# Patient Record
Sex: FEMALE | Race: OTHER | Employment: UNEMPLOYED | URBAN - METROPOLITAN AREA
[De-identification: names, ages, dates, MRNs, and addresses within clinical notes are randomized per-mention and may not be internally consistent; named-entity substitution may affect disease eponyms.]

---

## 2019-07-15 ENCOUNTER — OFFICE VISIT (OUTPATIENT)
Dept: FAMILY MEDICINE CLINIC | Age: 39
End: 2019-07-15

## 2019-07-15 VITALS
BODY MASS INDEX: 22.86 KG/M2 | DIASTOLIC BLOOD PRESSURE: 80 MMHG | RESPIRATION RATE: 16 BRPM | TEMPERATURE: 98.2 F | OXYGEN SATURATION: 99 % | HEIGHT: 63 IN | SYSTOLIC BLOOD PRESSURE: 100 MMHG | WEIGHT: 129 LBS | HEART RATE: 76 BPM

## 2019-07-15 DIAGNOSIS — R19.4 CHANGE IN BOWEL HABITS: Primary | ICD-10-CM

## 2019-07-15 DIAGNOSIS — R10.11 RUQ ABDOMINAL PAIN: ICD-10-CM

## 2019-07-15 RX ORDER — CHOLECALCIFEROL (VITAMIN D3) 125 MCG
CAPSULE ORAL
COMMUNITY

## 2019-07-15 NOTE — PROGRESS NOTES
Patient: Jing Gee MRN: [id number]  SSN: [id number]    YOB: 1980  Age: 42 y.o. Sex: female      Date of Service: 7/15/2019   Provider: NICOLLE Siegel   Office Location:   [address] [place], [address]  Office Phone: [phone]  Office Fax: [phone]        REASON FOR VISIT:   Chief Complaint   Patient presents with    New Patient    Abdominal Pain     changes in bowel movement - sometimes loose stool or constipation - wonders if ulcer and no pain today        VITALS:   Visit Vitals  /80 (BP 1 Location: Left arm, BP Patient Position: Sitting)   Pulse 76   Temp 98.2 °F (36.8 °C) (Oral)   Resp 16   Ht 5' 2.75\" (1.594 m)   Wt 129 lb (58.5 kg)   LMP 06/15/2019   SpO2 99%   BMI 23.03 kg/m²       MEDICATIONS:   Current Outpatient Medications   Medication Sig Dispense Refill    acetaminophen/pamabrom (MIDOL PO) Take  by mouth.  naproxen sodium (ALEVE) 220 mg cap Take  by mouth. ALLERGIES:   No Known Allergies     MEDICAL/SURGICAL HISTORY:  Past Medical History:   Diagnosis Date    Migraine       History reviewed. No pertinent surgical history. FAMILY HISTORY:  Family History   Problem Relation Age of Onset    Other Mother         stomach ulcer        SOCIAL HISTORY:  Social History     Tobacco Use    Smoking status: Former Smoker     Years: 15.00     Types: Cigarettes    Smokeless tobacco: Never Used    Tobacco comment: 2-3 cigs per day   Substance Use Topics    Alcohol use: Yes     Frequency: Monthly or less     Drinks per session: 3 or 4     Binge frequency: Less than monthly            HISTORY OF PRESENT ILLNESS:   Jing Gee is a 42 y.o. female who presents to the office to establish care as a new patient. She is transferring her care from [address] [place] [address] Here today with complaints of longstanding gastrointestinal issues.  Patient states symptoms began 5-6 years ago with constipation and blood in her stool. She experimented with her diet and eventually was able to get symptoms under control. She also uses witch hazel hemorrhoid wipes intermittently. Has not seen any blood in her stool for over a year. Lately, however, she has noticed some different gastrointestinal problems. States \"I feel like my food isn't being digested properly. \" Sometimes after eating she will develop pain in her mid-upper right abdomen. She wonders if she might have gallstones. She also notes that while her stools are solid and formed for the most part, she will occasionally have loose and watery stool for no apparent reason. Again, denies blood in her stool recently. She admits she has not paid much attention to see if certain foods are triggering her symptoms. She denies associated fevers, chills, nausea or vomiting  Reports that her mother has a history of peptic ulcers, but denies any FH of IBD or gastrointestinal cancers. Health Maintenance -   Previous PCP - [address] [place] [address] 64 PE with routine labs - UTD per patient   Last pap smear - 12/2018 through previous PCP, normal per patient   Immunization status -     REVIEW OF SYSTEMS:  ROS (see scanned H&P form for complete 12 point ROS)     PHYSICAL EXAMINATION:  Physical Exam   Constitutional: She is oriented to person, place, and time and well-developed, well-nourished, and in no distress. HENT:   Head: Normocephalic and atraumatic. Right Ear: External ear normal.   Left Ear: External ear normal.   Nose: Nose normal.   Eyes: Pupils are equal, round, and reactive to light. Conjunctivae are normal. Right eye exhibits no discharge. Left eye exhibits no discharge. Neck: Neck supple. Cardiovascular: Normal rate, regular rhythm and normal heart sounds. Exam reveals no gallop and no friction rub. No murmur heard. Pulmonary/Chest: Effort normal and breath sounds normal. No stridor. She has no wheezes. She has no rales.    Abdominal: Soft. Bowel sounds are normal. She exhibits no distension and no mass. There is no tenderness. There is no rebound and no guarding. Lymphadenopathy:     She has no cervical adenopathy. Neurological: She is alert and oriented to person, place, and time. Gait normal.   Skin: Skin is warm and dry. No rash noted. Psychiatric: Mood, memory and affect normal.        RESULTS:  No results found for this visit on 07/15/19. ASSESSMENT/PLAN:  Diagnoses and all orders for this visit:    1. Change in bowel habits  2. RUQ abdominal pain  - Patient states she had routine labs drawn recently through her old PCP. Unsure if they checked a TSH. Will request those records  - For starters, I've asked her to keep a diary of her symptoms/diet to try to identify potential trigger foods (may be a lactose or gluten intolerance)  - Given post prandial RUQ pain will get ultrasound to r/o gallbladder ds  Orders:    -      ABD LTD; Future      Follow-up and Dispositions    · Return for follow-up in early September for routine care.             PATIENT CARE TEAM:   Patient Care Team:  NICOLLE Natarajan as PCP - General (Physician Assistant)       NICOLLE Fam   July 15, 2019   12:45 PM

## 2019-07-15 NOTE — PROGRESS NOTES
1. Have you been to the ER, urgent care clinic since your last visit? Hospitalized since your last visit? No    2. Have you seen or consulted any other health care providers outside of the [address] [place] [address] since your last visit? Include any pap smears or colon screening. No    Chief Complaint   Patient presents with    New Patient    Abdominal Pain     changes in bowel movement - sometimes loose stool or constipation - wonders if ulcer and no pain today       Last pap smear 12/2018 [address] - normal exam as per patient.

## 2019-10-22 ENCOUNTER — HOSPITAL ENCOUNTER (OUTPATIENT)
Dept: ULTRASOUND IMAGING | Age: 39
Discharge: HOME | End: 2019-10-22
Payer: MEDICAID

## 2019-10-22 DIAGNOSIS — Z32.01 POSITIVE PREGNANCY TEST: ICD-10-CM

## 2019-10-22 PROCEDURE — 93975 VASCULAR STUDY: CPT

## 2021-04-06 ENCOUNTER — TRANSCRIBE ORDER (OUTPATIENT)
Dept: SCHEDULING | Age: 41
End: 2021-04-06

## 2021-04-06 DIAGNOSIS — R10.11 ABDOMINAL PAIN, RIGHT UPPER QUADRANT: Primary | ICD-10-CM

## 2021-04-27 ENCOUNTER — HOSPITAL ENCOUNTER (OUTPATIENT)
Dept: ULTRASOUND IMAGING | Age: 41
Discharge: HOME | End: 2021-04-27
Attending: NURSE PRACTITIONER
Payer: MEDICAID

## 2021-04-27 DIAGNOSIS — R10.11 ABDOMINAL PAIN, RIGHT UPPER QUADRANT: ICD-10-CM

## 2021-04-27 PROCEDURE — 76705 ECHO EXAM OF ABDOMEN: CPT

## 2021-04-27 PROCEDURE — 93975 VASCULAR STUDY: CPT

## 2021-05-10 ENCOUNTER — HOSPITAL ENCOUNTER (EMERGENCY)
Age: 41
Discharge: HOME | End: 2021-05-10
Attending: EMERGENCY MEDICINE
Payer: MEDICAID

## 2021-05-10 ENCOUNTER — APPOINTMENT (OUTPATIENT)
Dept: ULTRASOUND IMAGING | Age: 41
End: 2021-05-10
Attending: PHYSICIAN ASSISTANT
Payer: MEDICAID

## 2021-05-10 ENCOUNTER — APPOINTMENT (OUTPATIENT)
Dept: CT IMAGING | Age: 41
End: 2021-05-10
Attending: PHYSICIAN ASSISTANT
Payer: MEDICAID

## 2021-05-10 VITALS
HEART RATE: 79 BPM | DIASTOLIC BLOOD PRESSURE: 75 MMHG | TEMPERATURE: 98.8 F | BODY MASS INDEX: 25.83 KG/M2 | OXYGEN SATURATION: 99 % | RESPIRATION RATE: 18 BRPM | HEIGHT: 65 IN | SYSTOLIC BLOOD PRESSURE: 110 MMHG | WEIGHT: 155 LBS

## 2021-05-10 DIAGNOSIS — R10.11 ABDOMINAL PAIN, RIGHT UPPER QUADRANT: Primary | ICD-10-CM

## 2021-05-10 DIAGNOSIS — R93.89 THICKENED ENDOMETRIUM: ICD-10-CM

## 2021-05-10 LAB
ALBUMIN SERPL-MCNC: 3.7 G/DL (ref 3.4–5)
ALBUMIN/GLOB SERPL: 1.1 {RATIO} (ref 0.8–1.7)
ALP SERPL-CCNC: 113 U/L (ref 45–117)
ALT SERPL-CCNC: 18 U/L (ref 13–56)
ANION GAP SERPL CALC-SCNC: 4 MMOL/L (ref 3–18)
APPEARANCE UR: CLEAR
AST SERPL-CCNC: 9 U/L (ref 10–38)
BASOPHILS # BLD: 0 K/UL (ref 0–0.1)
BASOPHILS NFR BLD: 0 % (ref 0–2)
BILIRUB SERPL-MCNC: 0.9 MG/DL (ref 0.2–1)
BILIRUB UR QL: NEGATIVE
BUN SERPL-MCNC: 14 MG/DL (ref 7–18)
BUN/CREAT SERPL: 24 (ref 12–20)
CALCIUM SERPL-MCNC: 8.7 MG/DL (ref 8.5–10.1)
CHLORIDE SERPL-SCNC: 107 MMOL/L (ref 100–111)
CO2 SERPL-SCNC: 28 MMOL/L (ref 21–32)
COLOR UR: YELLOW
CREAT SERPL-MCNC: 0.59 MG/DL (ref 0.6–1.3)
DIFFERENTIAL METHOD BLD: NORMAL
EOSINOPHIL # BLD: 0.1 K/UL (ref 0–0.4)
EOSINOPHIL NFR BLD: 1 % (ref 0–5)
ERYTHROCYTE [DISTWIDTH] IN BLOOD BY AUTOMATED COUNT: 12.8 % (ref 11.6–14.5)
GLOBULIN SER CALC-MCNC: 3.4 G/DL (ref 2–4)
GLUCOSE SERPL-MCNC: 155 MG/DL (ref 74–99)
GLUCOSE UR STRIP.AUTO-MCNC: NEGATIVE MG/DL
HCG UR QL: NEGATIVE
HCT VFR BLD AUTO: 37.7 % (ref 35–45)
HGB BLD-MCNC: 12.8 G/DL (ref 12–16)
HGB UR QL STRIP: NEGATIVE
KETONES UR QL STRIP.AUTO: NEGATIVE MG/DL
LEUKOCYTE ESTERASE UR QL STRIP.AUTO: NEGATIVE
LIPASE SERPL-CCNC: 158 U/L (ref 73–393)
LYMPHOCYTES # BLD: 2 K/UL (ref 0.9–3.6)
LYMPHOCYTES NFR BLD: 21 % (ref 21–52)
MAGNESIUM SERPL-MCNC: 2.2 MG/DL (ref 1.6–2.6)
MCH RBC QN AUTO: 29.3 PG (ref 24–34)
MCHC RBC AUTO-ENTMCNC: 34 G/DL (ref 31–37)
MCV RBC AUTO: 86.3 FL (ref 74–97)
MONOCYTES # BLD: 0.5 K/UL (ref 0.05–1.2)
MONOCYTES NFR BLD: 5 % (ref 3–10)
NEUTS SEG # BLD: 6.9 K/UL (ref 1.8–8)
NEUTS SEG NFR BLD: 73 % (ref 40–73)
NITRITE UR QL STRIP.AUTO: NEGATIVE
PH UR STRIP: 6 [PH] (ref 5–8)
PLATELET # BLD AUTO: 278 K/UL (ref 135–420)
PMV BLD AUTO: 10.1 FL (ref 9.2–11.8)
POTASSIUM SERPL-SCNC: 3.7 MMOL/L (ref 3.5–5.5)
PROT SERPL-MCNC: 7.1 G/DL (ref 6.4–8.2)
PROT UR STRIP-MCNC: NEGATIVE MG/DL
RBC # BLD AUTO: 4.37 M/UL (ref 4.2–5.3)
SODIUM SERPL-SCNC: 139 MMOL/L (ref 136–145)
SP GR UR REFRACTOMETRY: 1.02 (ref 1–1.03)
UROBILINOGEN UR QL STRIP.AUTO: 0.2 EU/DL (ref 0.2–1)
WBC # BLD AUTO: 9.6 K/UL (ref 4.6–13.2)

## 2021-05-10 PROCEDURE — 74011000636 HC RX REV CODE- 636: Performed by: EMERGENCY MEDICINE

## 2021-05-10 PROCEDURE — 99283 EMERGENCY DEPT VISIT LOW MDM: CPT

## 2021-05-10 PROCEDURE — 80053 COMPREHEN METABOLIC PANEL: CPT

## 2021-05-10 PROCEDURE — 74177 CT ABD & PELVIS W/CONTRAST: CPT

## 2021-05-10 PROCEDURE — 83735 ASSAY OF MAGNESIUM: CPT

## 2021-05-10 PROCEDURE — 81003 URINALYSIS AUTO W/O SCOPE: CPT

## 2021-05-10 PROCEDURE — 87491 CHLMYD TRACH DNA AMP PROBE: CPT

## 2021-05-10 PROCEDURE — 85025 COMPLETE CBC W/AUTO DIFF WBC: CPT

## 2021-05-10 PROCEDURE — 76856 US EXAM PELVIC COMPLETE: CPT

## 2021-05-10 PROCEDURE — 83690 ASSAY OF LIPASE: CPT

## 2021-05-10 PROCEDURE — 81025 URINE PREGNANCY TEST: CPT

## 2021-05-10 RX ORDER — NAPROXEN 500 MG/1
500 TABLET ORAL 2 TIMES DAILY WITH MEALS
Qty: 20 TAB | Refills: 0 | Status: SHIPPED | OUTPATIENT
Start: 2021-05-10 | End: 2021-05-20

## 2021-05-10 RX ADMIN — IOPAMIDOL 100 ML: 612 INJECTION, SOLUTION INTRAVENOUS at 20:01

## 2021-05-10 NOTE — ED PROVIDER NOTES
EMERGENCY DEPARTMENT HISTORY AND PHYSICAL EXAM    Date: 5/10/2021  Patient Name: Jorge Luis Pittman    History of Presenting Illness     Chief Complaint   Patient presents with    Abdominal Pain         History Provided By: Patient    Chief Complaint: Abdominal pain  Duration: Intermittently for the past 3 years  Timing: Worsening  Location: Right upper quadrant and suprapubic  Quality: Dull  Severity: Moderate  Modifying Factors: Worse with pressure  Associated Symptoms: Vaginal discharge      Additional History (Context): Jorge Luis Pittman is a 47 y.o. female with a history of migraines who presents today for issues listed above. Patient reports a history of 1 . States she has been dealing with this intermittently for the past 3 years. States she has had negative ultrasounds in the past.  Patient also reports a history of vaginal discharge which she is unsure if this is normal for her or not. States it is clear and odorous. Denies concerns for STDs. Has not tried thing for this at home. States she is followed by [facility]. Denies any recent chest pain, shortness of breath, anxiety, nausea, vomiting, diarrhea or constipation. States bowel movements are normal.      PCP: NICOLLE Frederick    Current Outpatient Medications   Medication Sig Dispense Refill    naproxen (Naprosyn) 500 mg tablet Take 1 Tab by mouth two (2) times daily (with meals) for 10 days. 20 Tab 0    acetaminophen/pamabrom (MIDOL PO) Take  by mouth.  naproxen sodium (ALEVE) 220 mg cap Take  by mouth. Past History     Past Medical History:  Past Medical History:   Diagnosis Date    Migraine        Past Surgical History:  No past surgical history on file.     Family History:  Family History   Problem Relation Age of Onset    Other Mother         stomach ulcer       Social History:  Social History     Tobacco Use    Smoking status: Former Smoker     Years: 15.00     Types: Cigarettes    Smokeless tobacco: Never Used    Tobacco comment: 2-3 cigs per day   Substance Use Topics    Alcohol use: Yes     Frequency: Monthly or less     Drinks per session: 3 or 4     Binge frequency: Less than monthly    Drug use: Not Currently     Types: Marijuana       Allergies:  No Known Allergies      Review of Systems   Review of Systems   Constitutional: Negative for chills and fever. HENT: Negative for congestion, rhinorrhea and sore throat. Respiratory: Negative for cough and shortness of breath. Cardiovascular: Negative for chest pain. Gastrointestinal: Positive for abdominal pain. Negative for blood in stool, constipation, diarrhea, nausea and vomiting. Genitourinary: Positive for vaginal discharge. Negative for dysuria, frequency and hematuria. Musculoskeletal: Negative for back pain and myalgias. Skin: Negative for rash and wound. Neurological: Negative for dizziness and headaches. All other systems reviewed and are negative. All Other Systems Negative  Physical Exam     Vitals:    05/10/21 1752   BP: 110/75   Pulse: 79   Resp: 18   Temp: 98.8 °F (37.1 °C)   SpO2: 99%   Weight: 70.3 kg (155 lb)   Height: 5' 5\" (1.651 m)     Physical Exam  Vitals signs and nursing note reviewed. Constitutional:       General: She is not in acute distress. Appearance: She is well-developed. She is not diaphoretic. Comments: No acute distress   HENT:      Head: Normocephalic and atraumatic. Eyes:      Conjunctiva/sclera: Conjunctivae normal.   Neck:      Musculoskeletal: Normal range of motion and neck supple. Cardiovascular:      Rate and Rhythm: Normal rate and regular rhythm. Heart sounds: Normal heart sounds. Pulmonary:      Effort: Pulmonary effort is normal. No respiratory distress. Breath sounds: Normal breath sounds. Chest:      Chest wall: No tenderness. Abdominal:      General: Bowel sounds are normal. There is no distension. Palpations: Abdomen is soft. Tenderness: There is abdominal tenderness in the right upper quadrant, suprapubic area and left lower quadrant. There is no guarding or rebound. Comments: No guarding or peritoneal signs   Musculoskeletal:         General: No deformity. Skin:     General: Skin is warm and dry. Neurological:      Mental Status: She is alert and oriented to person, place, and time. Deep Tendon Reflexes: Reflexes are normal and symmetric. Diagnostic Study Results     Labs -     Recent Results (from the past 12 hour(s))   URINALYSIS W/ RFLX MICROSCOPIC    Collection Time: 05/10/21  5:57 PM   Result Value Ref Range    Color YELLOW      Appearance CLEAR      Specific gravity 1.019 1.005 - 1.030      pH (UA) 6.0 5.0 - 8.0      Protein Negative NEG mg/dL    Glucose Negative NEG mg/dL    Ketone Negative NEG mg/dL    Bilirubin Negative NEG      Blood Negative NEG      Urobilinogen 0.2 0.2 - 1.0 EU/dL    Nitrites Negative NEG      Leukocyte Esterase Negative NEG     HCG URINE, QL    Collection Time: 05/10/21  5:57 PM   Result Value Ref Range    HCG urine, QL Negative NEG     CBC WITH AUTOMATED DIFF    Collection Time: 05/10/21  7:24 PM   Result Value Ref Range    WBC 9.6 4.6 - 13.2 K/uL    RBC 4.37 4.20 - 5.30 M/uL    HGB 12.8 12.0 - 16.0 g/dL    HCT 37.7 35.0 - 45.0 %    MCV 86.3 74.0 - 97.0 FL    MCH 29.3 24.0 - 34.0 PG    MCHC 34.0 31.0 - 37.0 g/dL    RDW 12.8 11.6 - 14.5 %    PLATELET 624 305 - 143 K/uL    MPV 10.1 9.2 - 11.8 FL    NEUTROPHILS 73 40 - 73 %    LYMPHOCYTES 21 21 - 52 %    MONOCYTES 5 3 - 10 %    EOSINOPHILS 1 0 - 5 %    BASOPHILS 0 0 - 2 %    ABS. NEUTROPHILS 6.9 1.8 - 8.0 K/UL    ABS. LYMPHOCYTES 2.0 0.9 - 3.6 K/UL    ABS. MONOCYTES 0.5 0.05 - 1.2 K/UL    ABS. EOSINOPHILS 0.1 0.0 - 0.4 K/UL    ABS.  BASOPHILS 0.0 0.0 - 0.1 K/UL    DF AUTOMATED     METABOLIC PANEL, COMPREHENSIVE    Collection Time: 05/10/21  7:24 PM   Result Value Ref Range    Sodium 139 136 - 145 mmol/L    Potassium 3.7 3.5 - 5.5 mmol/L    Chloride 107 100 - 111 mmol/L    CO2 28 21 - 32 mmol/L    Anion gap 4 3.0 - 18 mmol/L    Glucose 155 (H) 74 - 99 mg/dL    BUN 14 7.0 - 18 MG/DL    Creatinine 0.59 (L) 0.6 - 1.3 MG/DL    BUN/Creatinine ratio 24 (H) 12 - 20      GFR est AA >60 >60 ml/min/1.73m2    GFR est non-AA >60 >60 ml/min/1.73m2    Calcium 8.7 8.5 - 10.1 MG/DL    Bilirubin, total 0.9 0.2 - 1.0 MG/DL    ALT (SGPT) 18 13 - 56 U/L    AST (SGOT) 9 (L) 10 - 38 U/L    Alk. phosphatase 113 45 - 117 U/L    Protein, total 7.1 6.4 - 8.2 g/dL    Albumin 3.7 3.4 - 5.0 g/dL    Globulin 3.4 2.0 - 4.0 g/dL    A-G Ratio 1.1 0.8 - 1.7     LIPASE    Collection Time: 05/10/21  7:24 PM   Result Value Ref Range    Lipase 158 73 - 393 U/L   MAGNESIUM    Collection Time: 05/10/21  7:24 PM   Result Value Ref Range    Magnesium 2.2 1.6 - 2.6 mg/dL       Radiologic Studies -   US PELV NON OB W TV W DOPPLER   Final Result   1. Thickened endometrium as seen on CT. Correlate with menstrual cycle. 2. Enlarging complex mostly solid cystic nodule in the right ovary, questionable   collapsed hemorrhagic cyst or endometrioma. Follow-up recommended. No ovarian   torsion. No free fluid. CT ABD PELV W CONT   Final Result   1. No bowel obstruction. No ascites. No focal inflammation. 2. Enlarged uterus with moderate low-density material in the endometrial canal.   Correlate with menstrual cycle. Heterogenous cervix. Correlation with pelvic   examination. Large right adnexal structure, large ovary or ovarian cyst 5 x 4   cm. CT Results  (Last 48 hours)               05/10/21 2005  CT ABD PELV W CONT Final result    Impression:  1. No bowel obstruction. No ascites. No focal inflammation. 2. Enlarged uterus with moderate low-density material in the endometrial canal.   Correlate with menstrual cycle. Heterogenous cervix. Correlation with pelvic   examination. Large right adnexal structure, large ovary or ovarian cyst 5 x 4   cm.        Narrative:  CT abdomen pelvis with 100 cc Isovue-300 IV contrast       HISTORY: Abdominal pain for 3 years. Now in ED with most recent pain and   bloating starting last night. All CT scans at this facility are performed using dose optimization technique as   appropriate to a performed exam, to include automated exposure control,   adjustment of the mA and/or kV according to patient size (including appropriate   matching for site specific examination) or use of iterative reconstruction   technique. Lung bases are clear. Liver and spleen unremarkable. Contracted gallbladder. Normal pancreas. No   adrenal mass. Kidneys are normal.       Aorta shows normal caliber. Stomach is full width oral intake. Small bowel is nondistended. Colon with mild   fecal fecal retention and diverticulosis. No diverticulitis. Normal appendix. Uterus is enlarged with moderate low density nodule in the endometrial canal.   Heterogeneous cervix. Left adnexal structure at 2-3 cm, likely left ovary. Much   larger right adnexal structure, to 5 x 4 cm. No pelvic free fluid. Bladder is   unremarkable. No acute bony abnormalities. CXR Results  (Last 48 hours)    None            Medical Decision Making   I am the first provider for this patient. I reviewed the vital signs, available nursing notes, past medical history, past surgical history, family history and social history. Vital Signs-Reviewed the patient's vital signs. Records Reviewed: Nursing Notes and Old Medical Records     Procedures: None   Procedures    Provider Notes (Medical Decision Making):     Differential: Cholelithiasis, cholecystitis, pancreatitis, pelvic infection, ovarian cyst, pregnancy, UTI, pyelonephritis, diverticulitis    Plan: We will order wet prep and GC swab. Will order UA and urine pregnancy. Will order labs and CT abdomen and pelvis.     9:08 PM  Large right adnexal structure found on CT, will order transvaginal ultrasound to further evaluate. 10:58 PM  Have discussed overall reassuring ultrasound with patient. Have advised primary care follow-up. Will discharge home with Naprosyn for discomfort. Return precautions have been given. Will discharge home. MED RECONCILIATION:  No current facility-administered medications for this encounter. Current Outpatient Medications   Medication Sig    naproxen (Naprosyn) 500 mg tablet Take 1 Tab by mouth two (2) times daily (with meals) for 10 days.  acetaminophen/pamabrom (MIDOL PO) Take  by mouth.  naproxen sodium (ALEVE) 220 mg cap Take  by mouth. Disposition:  Home     DISCHARGE NOTE:   Pt has been reexamined. Patient has no new complaints, changes, or physical findings. Care plan outlined and precautions discussed. Results of workup were reviewed with the patient. All medications were reviewed with the patient. All of pt's questions and concerns were addressed. Patient was instructed and agrees to follow up with PCP as well as to return to the ED upon further deterioration. Patient is ready to go home. Follow-up Information     Follow up With Specialties Details Why Contact Info    [facility] EMERGENCY DEPT Emergency Medicine  As needed [address] [place] [address]    NICOLLE Pineda Physician Assistant Schedule an appointment as soon as possible for a visit   Daniela [address] [facility]  [phone]            Current Discharge Medication List      START taking these medications    Details   naproxen (Naprosyn) 500 mg tablet Take 1 Tab by mouth two (2) times daily (with meals) for 10 days. Qty: 20 Tab, Refills: 0  Start date: 5/10/2021, End date: 5/20/2021                 Diagnosis     Clinical Impression:   1. Abdominal pain, right upper quadrant    2. Thickened endometrium          \"Please note that this dictation was completed with Accord, the computer voice recognition software.  Quite often unanticipated grammatical, syntax, homophones, and other interpretive errors are inadvertently transcribed by the computer software. Please disregard these errors. Please excuse any errors that have escaped final proofreading. \"

## 2021-05-10 NOTE — ED TRIAGE NOTES
Patient arrived from home to triage presenting with upper abdominal pain that has been ongoing for 3 years, lower abdominal pain that has been more recent and bloating starting last night. Patient had a c section almost 12 months ago.

## 2021-05-10 NOTE — Clinical Note
[facility] EMERGENCY DEPT 
[address] [facility] [address] [phone] Work/School Note Date: 5/10/2021 To Whom It May concern: 
 
 
Luis Severino was seen and treated today in the emergency room by the following provider(s): 
Attending Provider: Myesha Crowe MD 
Physician Assistant: NICOLLE Fulton. Luis Severino is excused from work/school on 05/10/21. She is clear to return to work/school on 05/11/21. Sincerely, NICOLLE Holliday

## 2021-05-12 ENCOUNTER — HOSPITAL ENCOUNTER (EMERGENCY)
Age: 41
Discharge: HOME | End: 2021-05-12
Attending: EMERGENCY MEDICINE
Payer: MEDICAID

## 2021-05-12 VITALS
HEART RATE: 72 BPM | OXYGEN SATURATION: 100 % | SYSTOLIC BLOOD PRESSURE: 131 MMHG | TEMPERATURE: 98.3 F | DIASTOLIC BLOOD PRESSURE: 94 MMHG | RESPIRATION RATE: 12 BRPM

## 2021-05-12 DIAGNOSIS — R10.30 LOWER ABDOMINAL PAIN: Primary | ICD-10-CM

## 2021-05-12 DIAGNOSIS — R82.71 BACTERIURIA: ICD-10-CM

## 2021-05-12 LAB
APPEARANCE UR: CLEAR
BACTERIA URNS QL MICRO: ABNORMAL /HPF
BILIRUB UR QL: NEGATIVE
COLOR UR: YELLOW
EPITH CASTS URNS QL MICRO: ABNORMAL /LPF (ref 0–5)
GLUCOSE UR STRIP.AUTO-MCNC: NEGATIVE MG/DL
HCG UR QL: NEGATIVE
HGB UR QL STRIP: NEGATIVE
KETONES UR QL STRIP.AUTO: NEGATIVE MG/DL
LEUKOCYTE ESTERASE UR QL STRIP.AUTO: ABNORMAL
NITRITE UR QL STRIP.AUTO: NEGATIVE
PH UR STRIP: 7 [PH] (ref 5–8)
PROT UR STRIP-MCNC: NEGATIVE MG/DL
SERVICE CMNT-IMP: NORMAL
SP GR UR REFRACTOMETRY: 1.02 (ref 1–1.03)
UROBILINOGEN UR QL STRIP.AUTO: 1 EU/DL (ref 0.2–1)
WBC URNS QL MICRO: ABNORMAL /HPF (ref 0–4)
WET PREP GENITAL: NORMAL

## 2021-05-12 PROCEDURE — 81025 URINE PREGNANCY TEST: CPT

## 2021-05-12 PROCEDURE — 99282 EMERGENCY DEPT VISIT SF MDM: CPT

## 2021-05-12 PROCEDURE — 87086 URINE CULTURE/COLONY COUNT: CPT

## 2021-05-12 PROCEDURE — 81001 URINALYSIS AUTO W/SCOPE: CPT

## 2021-05-12 PROCEDURE — 87210 SMEAR WET MOUNT SALINE/INK: CPT

## 2021-05-12 RX ORDER — CEPHALEXIN 500 MG/1
500 CAPSULE ORAL 2 TIMES DAILY
Qty: 14 CAP | Refills: 0 | Status: SHIPPED | OUTPATIENT
Start: 2021-05-12 | End: 2021-05-19

## 2021-05-12 NOTE — ED NOTES
Patient is requesting to perform self swabs. States, Steve Doherty were supposed to do it last time, but they never gave me any swabs. \"

## 2021-05-12 NOTE — ED TRIAGE NOTES
The patient presents for evaluation of abdominal pain x three days. Denies nausea, vomiting, or diarrhea. States, \"I think it's a UTI. \"

## 2021-05-12 NOTE — DISCHARGE INSTRUCTIONS

## 2021-05-12 NOTE — ED NOTES
Instructed patient on the need for a urine specimen. Patient also instructed on proper technique for obtaining vaginal specimen; patient verbalized understanding. Specimen containers provided.

## 2021-05-12 NOTE — ED PROVIDER NOTES
EMERGENCY DEPARTMENT HISTORY AND PHYSICAL EXAM    9:31 AM      Date: 5/12/2021  Patient Name: Jennifer Greenwood    History of Presenting Illness     Chief Complaint   Patient presents with    Abdominal Pain         History Provided By: Patient    Additional History (Context): Jennifer Greenwood is a 47 y.o. female with hx of migraine who presents with complaint of lower abdominal pain x3 days. Pt notes associated vaginal discharge. Denies fever or chills, nausea, vomiting, diarrhea, dysuria, hematuria. Notes she was evaluated in the emergency department 2 days ago for symptoms. \"I think it's a UTI\". PCP: NICOLLE Ovalle    Current Outpatient Medications   Medication Sig Dispense Refill    cephALEXin (Keflex) 500 mg capsule Take 1 Cap by mouth two (2) times a day for 7 days. 14 Cap 0    naproxen (Naprosyn) 500 mg tablet Take 1 Tab by mouth two (2) times daily (with meals) for 10 days. 20 Tab 0    acetaminophen/pamabrom (MIDOL PO) Take  by mouth.  naproxen sodium (ALEVE) 220 mg cap Take  by mouth. Past History     Past Medical History:  Past Medical History:   Diagnosis Date    Migraine        Past Surgical History:  History reviewed. No pertinent surgical history. Family History:  Family History   Problem Relation Age of Onset    Other Mother         stomach ulcer       Social History:  Social History     Tobacco Use    Smoking status: Former Smoker     Years: 15.00     Types: Cigarettes    Smokeless tobacco: Never Used    Tobacco comment: 2-3 cigs per day   Substance Use Topics    Alcohol use: Yes     Frequency: Monthly or less     Drinks per session: 3 or 4     Binge frequency: Less than monthly    Drug use: Not Currently     Types: Marijuana       Allergies:  No Known Allergies      Review of Systems       Review of Systems   Constitutional: Negative for chills and fever. Respiratory: Negative for shortness of breath. Cardiovascular: Negative for chest pain. Gastrointestinal: Positive for abdominal pain. Negative for nausea and vomiting. Genitourinary: Positive for vaginal discharge. Skin: Negative for rash. Neurological: Negative for weakness. All other systems reviewed and are negative. Physical Exam     Visit Vitals  BP (!) 131/94 (BP 1 Location: Left upper arm, BP Patient Position: At rest)   Pulse 72   Temp 98.3 °F (36.8 °C)   Resp 12   SpO2 100%         Physical Exam  Vitals signs and nursing note reviewed. Constitutional:       General: She is not in acute distress. Appearance: She is well-developed. She is not ill-appearing, toxic-appearing or diaphoretic. HENT:      Head: Normocephalic and atraumatic. Neck:      Musculoskeletal: Normal range of motion and neck supple. Cardiovascular:      Rate and Rhythm: Normal rate and regular rhythm. Heart sounds: Normal heart sounds. No murmur. No friction rub. No gallop. Pulmonary:      Effort: Pulmonary effort is normal. No respiratory distress. Breath sounds: Normal breath sounds. No wheezing or rales. Abdominal:      General: Abdomen is flat. Bowel sounds are normal.      Palpations: Abdomen is soft. Tenderness: There is abdominal tenderness (mild) in the suprapubic area and left lower quadrant. There is no right CVA tenderness, left CVA tenderness, guarding or rebound. Genitourinary:     Comments: Self swabs sent from triage  Musculoskeletal: Normal range of motion. Skin:     General: Skin is warm. Findings: No rash. Neurological:      Mental Status: She is alert.            Diagnostic Study Results     Labs -  Recent Results (from the past 12 hour(s))   URINALYSIS W/ RFLX MICROSCOPIC    Collection Time: 05/12/21  8:45 AM   Result Value Ref Range    Color YELLOW      Appearance CLEAR      Specific gravity 1.025 1.005 - 1.030      pH (UA) 7.0 5.0 - 8.0      Protein Negative NEG mg/dL    Glucose Negative NEG mg/dL    Ketone Negative NEG mg/dL    Bilirubin Negative NEG      Blood Negative NEG      Urobilinogen 1.0 0.2 - 1.0 EU/dL    Nitrites Negative NEG      Leukocyte Esterase SMALL (A) NEG     WET PREP    Collection Time: 05/12/21  8:45 AM    Specimen: Vaginal Specimen   Result Value Ref Range    Special Requests: NO SPECIAL REQUESTS      Wet prep NO YEAST,TRICHOMONAS OR CLUE CELLS NOTED     HCG URINE, QL    Collection Time: 05/12/21  8:45 AM   Result Value Ref Range    HCG urine, QL Negative NEG     URINE MICROSCOPIC ONLY    Collection Time: 05/12/21  8:45 AM   Result Value Ref Range    WBC 2 to 4 0 - 4 /hpf    Epithelial cells 2+ 0 - 5 /lpf    Bacteria 2+ (A) NEG /hpf       Radiologic Studies -   No orders to display         Medical Decision Making   I am the first provider for this patient. I reviewed the vital signs, available nursing notes, past medical history, past surgical history, family history and social history. Vital Signs-Reviewed the patient's vital signs. Records Reviewed: Nursing Notes and Old Medical Records (Time of Review: 9:31 AM)  5/10 visit:  IMPRESSION  1. No bowel obstruction. No ascites. No focal inflammation. 2. Enlarged uterus with moderate low-density material in the endometrial canal.  Correlate with menstrual cycle. Heterogenous cervix. Correlation with pelvic  examination. Large right adnexal structure, large ovary or ovarian cyst 5 x 4  Cm.    TVUS   IMPRESSION  1. Thickened endometrium as seen on CT. Correlate with menstrual cycle. 2. Enlarging complex mostly solid cystic nodule in the right ovary, questionable  collapsed hemorrhagic cyst or endometrioma. Follow-up recommended. No ovarian torsion. No free fluid. ED Course: Progress Notes, Reevaluation, and Consults:  9:31 AM  Reviewed results with patient. Discussed need for close outpatient follow-up this week for reassessment. Discussed strict return precautions, including fever, vomiting, or any other medical concerns. Pt in agreement with plan, ready to go home. Provider Notes (Medical Decision Making): 77-year-old female who presents to the ED due to lower abdominal pain x 3 days. Afebrile, nontoxic-appearing, looks well. Abdomen soft, mild tenderness without evidence of rebound or guarding. Wet prep negative, UA with 2-4 WBCs, small leukocyte esterase. Urine culture sent and pending. Do not feel repeat labs or imaging are warranted at this time. Stable for discharge with close outpatient follow-up for further assessment. Strict return precautions provided. Diagnosis     Clinical Impression:   1. Lower abdominal pain    2. Bacteriuria        Disposition: home     Follow-up Information     Follow up With Specialties Details Why Contact Info    [facility] EMERGENCY DEPT Emergency Medicine  If symptoms worsen [address] [facility] [address] [facility]    NICOLLE Oropeza Physician Assistant In 2 days  [phone] [facility] [id number] [phone]             Patient's Medications   Start Taking    CEPHALEXIN (KEFLEX) 500 MG CAPSULE    Take 1 Cap by mouth two (2) times a day for 7 days. Continue Taking    ACETAMINOPHEN/PAMABROM (MIDOL PO)    Take  by mouth. NAPROXEN (NAPROSYN) 500 MG TABLET    Take 1 Tab by mouth two (2) times daily (with meals) for 10 days. NAPROXEN SODIUM (ALEVE) 220 MG CAP    Take  by mouth. These Medications have changed    No medications on file   Stop Taking    No medications on file       Dictation disclaimer:  Please note that this dictation was completed with [employer], the computer voice recognition software. Quite often unanticipated grammatical, syntax, homophones, and other interpretive errors are inadvertently transcribed by the computer software. Please disregard these errors. Please excuse any errors that have escaped final proofreading.

## 2021-05-14 LAB
BACTERIA SPEC CULT: NORMAL
C TRACH RRNA SPEC QL NAA+PROBE: NEGATIVE
CC UR VC: NORMAL
N GONORRHOEA RRNA SPEC QL NAA+PROBE: NEGATIVE
PLEASE NOTE:, 188601: NORMAL
SERVICE CMNT-IMP: NORMAL
SPECIMEN SOURCE: NORMAL

## 2021-08-09 ENCOUNTER — OFFICE VISIT (OUTPATIENT)
Dept: FAMILY MEDICINE CLINIC | Age: 41
End: 2021-08-09
Payer: MEDICAID

## 2021-08-09 VITALS
RESPIRATION RATE: 18 BRPM | HEIGHT: 65 IN | HEART RATE: 68 BPM | WEIGHT: 148.25 LBS | OXYGEN SATURATION: 98 % | TEMPERATURE: 98.4 F | DIASTOLIC BLOOD PRESSURE: 84 MMHG | SYSTOLIC BLOOD PRESSURE: 132 MMHG | BODY MASS INDEX: 24.7 KG/M2

## 2021-08-09 DIAGNOSIS — N83.8 MASS OF RIGHT OVARY: Primary | ICD-10-CM

## 2021-08-09 DIAGNOSIS — R06.83 LOUD SNORING: ICD-10-CM

## 2021-08-09 DIAGNOSIS — G43.001 MIGRAINE WITHOUT AURA AND WITH STATUS MIGRAINOSUS, NOT INTRACTABLE: ICD-10-CM

## 2021-08-09 DIAGNOSIS — N92.0 MENORRHAGIA WITH REGULAR CYCLE: ICD-10-CM

## 2021-08-09 DIAGNOSIS — L85.8 KERATOSIS PILARIS, ACQUIRED: ICD-10-CM

## 2021-08-09 DIAGNOSIS — R73.01 IMPAIRED FASTING BLOOD SUGAR: ICD-10-CM

## 2021-08-09 PROCEDURE — 99203 OFFICE O/P NEW LOW 30 MIN: CPT | Performed by: STUDENT IN AN ORGANIZED HEALTH CARE EDUCATION/TRAINING PROGRAM

## 2021-08-09 NOTE — PROGRESS NOTES
Chief Complaint   Patient presents with    New Patient    Migraine     hx of migraine     Dry Skin     c/o dry skin along with bumps (request for dermatologist referral)    Snoring     ongoing past 6 months for 1 year      1. Have you been to the ER, urgent care clinic since your last visit? Hospitalized since your last visit? No    2. Have you seen or consulted any other health care providers outside of the [address] [place] [address] since your last visit? Include any pap smears or colon screening.  No     No immunization record noted via [email or web address] (last known Tdap axp 2020 - Sveta to provide proof/copy)

## 2021-08-09 NOTE — PATIENT INSTRUCTIONS
Sleep Apnea: Care Instructions  Overview     Sleep apnea means that you frequently stop breathing for 10 seconds or longer during sleep. It can be mild to severe, based on the number of times an hour that you stop breathing or have slowed breathing. Blocked or narrowed airways in your nose, mouth, or throat can cause sleep apnea. Your airway can become blocked when your throat muscles and tongue relax during sleep. You can help treat sleep apnea at home by making lifestyle changes. You also can use a CPAP breathing machine that keeps tissues in the throat from blocking your airway. Or your doctor may suggest that you use a breathing device while you sleep. It helps keep your airway open. This could be a device that you put in your mouth. In some cases, surgery may be needed to remove enlarged tissues in the throat. Follow-up care is a key part of your treatment and safety. Be sure to make and go to all appointments, and call your doctor if you are having problems. It's also a good idea to know your test results and keep a list of the medicines you take. How can you care for yourself at home? · Lose weight, if needed. · Sleep on your side. It may help mild apnea. · Avoid alcohol and medicines such as sleeping pills, opioids, or sedatives before bed. · Don't smoke. If you need help quitting, talk to your doctor. · Prop up the head of your bed. · Treat breathing problems, such as a stuffy nose, that are caused by a cold or allergies. · Try a continuous positive airway pressure (CPAP) breathing machine if your doctor recommends it. · If CPAP doesn't work for you, ask your doctor if you can try other masks, settings, or breathing machines. · Try oral breathing devices or other nasal devices. · Talk to your doctor if your nose feels dry or bleeds, or if it gets runny or stuffy when you use a breathing machine. · Tell your doctor if you're sleepy during the day and it affects your daily life.  Don't drive or operate machinery when you're drowsy. When should you call for help? Watch closely for changes in your health, and be sure to contact your doctor if:    · You still have sleep apnea even though you have made lifestyle changes.     · You are thinking of trying a device such as CPAP.     · You are having problems using a CPAP or similar machine.     · You are still sleepy during the day, and it affects your daily life. Where can you learn more? Go to http://www.gray.com/  Enter J936 in the search box to learn more about \"Sleep Apnea: Care Instructions. \"  Current as of: October 26, 2020               Content Version: 12.8  © 2006-2021 [employer]. Care instructions adapted under license by [employer] (which disclaims liability or warranty for this information). If you have questions about a medical condition or this instruction, always ask your healthcare professional. [place] [address] any warranty or liability for your use of this information. Learning About Sleep Apnea  What is it? Sleep apnea means that breathing stops for short periods during sleep. When you stop breathing or have reduced airflow into your lungs during sleep, you don't sleep well and you can be very tired during the day. The oxygen levels in your blood may go down, and carbon dioxide levels go up. It may lead to other problems, such as high blood pressure and heart disease. Sleep apnea can range from mild to severe, based on how often breathing stops during sleep. Breathing may stop as few as 5 times an hour (mild apnea) to 30 or more times an hour (severe apnea). Obstructive sleep apnea is the most common type. This most often occurs because your airways are blocked or partly blocked. Central sleep apnea is less common. It happens when the brain has trouble controlling breathing. Some people have both types. That's called complex sleep apnea.   What are the symptoms? There are symptoms of sleep apnea that you may notice and symptoms that others may notice when you're asleep. Symptoms you may notice include:  · Feeling extremely sleepy during the day. · Feeling unrefreshed or tired after a night's sleep. · Problems with memory and concentration, or mood changes. · Morning headaches. · Getting up often during the night to urinate. · A dry mouth or sore throat in the morning. Your bed partner may notice that you:  · Have episodes of not breathing. · Snore loudly. Almost all people who have sleep apnea snore. But not all people who snore have sleep apnea. · Toss and turn during sleep. · Have nighttime choking or gasping spells. How is it diagnosed? Your doctor will probably do a physical exam and ask about your past health. The doctor may also ask you or your bed partner about your snoring and sleep behavior and how tired you feel during the day. Your doctor may suggest a sleep study. Sleep studies are a series of tests that look at what happens to the body during sleep. They check for how often you stop breathing or have too little air flowing into your lungs during sleep. They also find out how much oxygen you have in your blood during sleep. A sleep study may take place in your home. Or it might take place at a sleep center, where you will spend the night. If your sleep apnea doesn't improve with treatment, you may have more tests to find out what's causing it. How is it treated? You may be able to help treat sleep apnea by making some lifestyle changes. You could try to lose weight, sleep on your side, and avoid alcohol and medicines like sedatives before bed. Sleep apnea is often treated with machines that deliver air through a mask to help keep your airways open. These include:  · Continuous positive airway pressure (CPAP). This increases air pressure in your throat. It keeps your airway open when you breathe in.  It's the most common device. · Bilevel positive airway pressure (BiPAP). This uses different air pressures when you breathe in and out. · Adaptive servo ventilation (ASV). It senses pauses in breathing and adjusts air pressure. It's mostly used for central sleep apnea. You can also try oral breathing devices or nasal devices. If your tonsils or other tissues are blocking your airway, your doctor may suggest surgery to open the airway. How can you care for yourself at home? · Lose weight, if needed. · Sleep on your side. It may help mild apnea. · Avoid alcohol and medicines such as sleeping pills, opioids, or sedatives before bed. · Don't smoke. If you need help quitting, talk to your doctor. · Prop up the head of your bed. · Treat breathing problems, such as a stuffy nose, that are caused by a cold or allergies. · Try a continuous positive airway pressure (CPAP) breathing machine if your doctor recommends it. · If CPAP doesn't work for you, ask your doctor if you can try other masks, settings, or breathing machines. · Try oral breathing devices or other nasal devices. · Talk to your doctor if your nose feels dry or bleeds, or if it gets runny or stuffy when you use a breathing machine. · Tell your doctor if you're sleepy during the day and it affects your daily life. Don't drive or operate machinery when you're drowsy. Where can you learn more? Go to http://www.gray.com/  Enter S121 in the search box to learn more about \"Learning About Sleep Apnea. \"  Current as of: October 26, 2020               Content Version: 12.8  © 3095-8808 "[employer] Care instructions adapted under license by [employer] (which disclaims liability or warranty for this information). If you have questions about a medical condition or this instruction, always ask your healthcare professional. [address] any warranty or liability for your use of this information. Allergies: Care Instructions  Your Care Instructions     Allergies occur when your body's defense system (immune system) overreacts to certain substances. The immune system treats a harmless substance as if it were a harmful germ or virus. Many things can make this happen. These include pollens, medicine, food, dust, animal dander, and mold. Allergies can be mild or severe. Mild allergies can be managed with home treatment. But medicine may be needed to prevent problems. Managing your allergies is an important part of staying healthy. Your doctor may suggest that you have allergy testing to help find out what is causing your allergies. Severe allergies can cause reactions that affect your whole body (anaphylactic reactions). Your doctor may prescribe a shot of epinephrine to carry with you in case you have a severe reaction. Learn how to give yourself the shot and keep it with you at all times. Make sure it is not . Follow-up care is a key part of your treatment and safety. Be sure to make and go to all appointments, and call your doctor if you are having problems. It's also a good idea to know your test results and keep a list of the medicines you take. How can you care for yourself at home? · If you have been told by your doctor that dust or dust mites are causing your allergy, decrease the dust around your bed:  ? Wash sheets, pillowcases, and other bedding in hot water every week. ? Use dust-proof covers for pillows, duvets, and mattresses. Avoid plastic covers because they tear easily and do not \"breathe. \" Wash as instructed on the label. ? Do not use any blankets and pillows that you do not need. ? Use blankets that you can wash in your washing machine. ? Consider removing drapes and carpets, which attract and hold dust, from your bedroom. · If you are allergic to house dust and mites, do not use home humidifiers. Your doctor can suggest ways you can control dust and mites.   · Look for signs of cockroaches. Cockroaches cause allergic reactions. Use cockroach baits to get rid of them. Then, clean your home well. Cockroaches like areas where grocery bags, newspapers, empty bottles, or cardboard boxes are stored. Do not keep these inside your home, and keep trash and food containers sealed. Seal off any spots where cockroaches might enter your home. · If you are allergic to mold, get rid of furniture, rugs, and drapes that smell musty. Check for mold in the bathroom. · If you are allergic to outdoor pollen or mold spores, use air-conditioning. Change or clean all filters every month. Keep windows closed. · If you are allergic to pollen, stay inside when pollen counts are high. Use a vacuum  with a HEPA filter or a double-thickness filter at least two times each week. · Stay inside when air pollution is bad. Avoid paint fumes, perfumes, and other strong odors. · Avoid conditions that make your allergies worse. Stay away from smoke. Do not smoke or let anyone else smoke in your house. Do not use fireplaces or wood-burning stoves. · If you are allergic to your pets, change the air filter in your furnace every month. Use high-efficiency filters. · If you are allergic to pet dander, keep pets outside or out of your bedroom. Old carpet and cloth furniture can hold a lot of animal dander. You may need to replace them. When should you call for help? Give an epinephrine shot if:    · You think you are having a severe allergic reaction.     · You have symptoms in more than one body area, such as mild nausea and an itchy mouth. After giving an epinephrine shot call 911, even if you feel better. Call 911 if:    · You have symptoms of a severe allergic reaction. These may include:  ? Sudden raised, red areas (hives) all over your body. ? Swelling of the throat, mouth, lips, or tongue. ? Trouble breathing. ? Passing out (losing consciousness).  Or you may feel very lightheaded or suddenly feel weak, confused, or restless.     · You have been given an epinephrine shot, even if you feel better. Call your doctor now or seek immediate medical care if:    · You have symptoms of an allergic reaction, such as:  ? A rash or hives (raised, red areas on the skin). ? Itching. ? Swelling. ? Belly pain, nausea, or vomiting. Watch closely for changes in your health, and be sure to contact your doctor if:    · You do not get better as expected. Where can you learn more? Go to http://www.prieto.com/  Enter W171 in the search box to learn more about \"Allergies: Care Instructions. \"  Current as of: November 6, 2020               Content Version: 12.8  © 2006-2021 [employer]. Care instructions adapted under license by [employer] (which disclaims liability or warranty for this information). If you have questions about a medical condition or this instruction, always ask your healthcare professional. [address] any warranty or liability for your use of this information.     Sveta to provide proof/copy of Tdap vaccine

## 2021-08-09 NOTE — PROGRESS NOTES
Jennifer Dinh, 46 y.o.,  female  Chief Complaint   Patient presents with    New Patient    Migraine     hx of migraine     Dry Skin     c/o dry skin along with bumps (request for dermatologist referral)    Snoring     ongoing past 6 months for 1 year        SUBJECTIVE  History of Present Illness:  1. Mass of right ovary. Menorrhagia. Hirsutism. Abdominal pain:  Patient reports intermittent abdominal pain since  in . Heavy menstrual bleeding with clots. She recently noticed excessive facial hair growth, hot flashes, increased headaches before menses, fatigue. Menses are regular. Menarche at age 17. [id number].  1 delivery via  in May 2020. Presented to ED 5/10/2021 with abdominal pain. US pelvis (5/10/2021) revealed enlarged complex mostly solid cystic nodule in the right ovary. CT pelvis (5/10/2021): Large right adnexal structure, large ovary or ovarian cyst 5 x 4 cm, no pelvic free fluid. Patient denies vomiting, constipation, fever, chills, blood in stool. 2. Migraine:  Patient complains of throbbing headaches accompanied by nausea, phobia, photophobia. No aura. Precipitating factors: mensis  Relieving factors: Lying down, quiet and dark environment, taking Tylenol (4-6 tabl.)  3. Loud snoring   Patient reports loud snoring since she was pregnant. Was told that her snoring related to increased abdomen. States she continues snoring after her daughter was born. 4. Skin rash  Patient reports skin rash on the lateral aspect of both upper arms. She tried scrabbing but it aggravated her rash more. 5.  Elevated fasting glucose:  Most recent labs (5/10/2021) revealed elevated fasting serum glucose.       ROS:  Pertinent items are noted in HPI    OBJECTIVE    Physical Exam:     Visit Vitals  /84 (BP 1 Location: Right arm, BP Patient Position: Sitting, BP Cuff Size: Large adult)   Pulse 68   Temp 98.4 °F (36.9 °C) (Temporal)   Resp 18   Ht 5' 5\" (1.651 m)   Wt 148 lb 4 oz (67.2 kg)   LMP 07/15/2021 Comment: Axp mid month    SpO2 98%   BMI 24.67 kg/m²       General: alert, well-appearing, in no apparent distress or pain  Head: Normocephalic, atraumatic. Non-tender maxillary and frontal sinuses  Eyes: Lids with no discharge, no matting, conjunctivae clear and non injected, PERLLA. Allergic shiners noted. Ears: pinna non-tender, external auditory canal patent, TM intact  Mouth/throat: teeth, gums, palate normal appearing, moist oral mucosa, tonsils non enlarged, pharynx non erythematous and no lesion  Neck: supple, no JVD , no carotid bruit, no adenopathy palpated  CVS: normal rate, regular rhythm, distinct S1 and S2, no murmurs, rubs clicks or gallops  Lungs: Breathing not labored, good chest excursion, clear to ausculation bilaterally, no crackles, wheezing or rhonchi noted  Abdomen: normoactive bowel sounds, soft, mild suprapubic tenderness noted, no organomegaly. No CVA tenderness bilaterally  Extremities: no edema, no cyanosis,  Skin: warm. There are scattered multiple small follicular papules on the posterolateral aspect of the upper arm bilaterally. Psych: alert and oriented x3, mood, insight, speech and affect normal      ASSESSMENT/PLAN  Diagnoses and all orders for this visit:    ICD-10-CM ICD-9-CM    1. Mass of right ovary  N83.8 620.8 According to ultrasound impression, right ovarian mass can be collapsed hemorrhagic cyst or endometrioma. REFERRAL TO OBSTETRICS AND GYNECOLOGY for further evaluation of right ovarian mass. 2. Loud snoring  R06.83 786.09 SLEEP MEDICINE REFERRAL  Obstructive sleep apnea suspected. Polysomnography recommended. Referral to sleep medicine. 3. Impaired fasting blood sugar  R73.01 790.21 HEMOGLOBIN A1C WITH EAG  Most recent labs (5/10/2021) revealed elevated fasting serum glucose. Low-carb diet, regular exercise recommended.    4. Menorrhagia with regular cycle  N92.0 626.2 REFERRAL TO OBSTETRICS AND GYNECOLOGY   5. Keratosis pilaris, acquired  L85.8 701.1 Lifestyle modification: Limit bathing to 1-2 times weekly, do not use hot water, use a bland soap, use lotions after bathing such as Eucerin, Lac-Hydrin, Complex 15, take OTC  Benadryl. 6. Migraine without aura and with status migrainosus, not intractable  Y98.837 700.24 METABOLIC PANEL, COMPREHENSIVE  Lifestyle modifications commended. Patient advised to avoid triggers related to food (alcohol, caffeine, chocolate), lifestyle (lack of sleep, stress, anxiety), environment (bright or fluorescent lights, sunlight, loud noises, strong odors), keep a headache diary, learn stress management technique (meditation, yoga), regular exercise. Follow-up and Dispositions    · Return in about 2 months (around 10/9/2021) for  migraine, rash and fasting labs to be done 3-5 days prior .